# Patient Record
(demographics unavailable — no encounter records)

---

## 2024-10-30 NOTE — DISCUSSION/SUMMARY
[FreeTextEntry1] : Patient's therapist Jessica Tam's call returned today. ([phone]) LVM regarding purpose of the call and to call back office between 4:30 to 5 PM today or call tomorrow and leave message with therapists' availability to coordinate a call for case collaboration.

## 2024-12-04 NOTE — DISCUSSION/SUMMARY
[FreeTextEntry1] : Patient is a 35-year-old woman with first episode of major depression, mild in her early 20s that resolved with therapy, was stable without any symptoms of depression until 2017 about a year after her first childbirth, was seen for an evaluation on November 6, 2017, prescribed fluoxetine for treatment of depression but she did not come back for follow-up and did not start taking the medication, became pregnant had second child followed by postpartum depression after second childbirth reports the symptoms were significant, affecting her functioning and lasted up to 8 months and then she had a slight improvement of symptoms for about 3 months before the pandemic and the stress related to that and again becoming pregnant triggered a relapse of symptoms of depression and anxiety, gave birth to her youngest daughter 6 months ago, now presents with persisting, worsening symptoms consistent with dx of major depressive disorder and anxiety disorder.  Patient states she is not able to function and is struggling to maintain daily functioning and take care of her 3 children and feels that she would need help to treat her symptoms in addition to individual psychotherapy.  3/7/2022: Patient reports some improvement of anxiety sx, though reports sx of depression continue and she is tolerating Zoloft, will increase the dose to maximize therapeutic benefit.   4/5/2022:Patient reports some improvement of symptoms depression since 50% of improvement, she's tolerating the medication without side effects and would benefit by increasing the dose to increase the therapeutic benefit.  5/10/2022: Patient reports she noticed further improvement of symptoms of anxiety and depression and feels that there is about 85% improvement of symptoms since she started taking sertraline, reports no side effects and would benefit by increasing the dose for maximizing the therapeutic benefit.  Also discussed with the patient to work with her therapist to learn coping skills and cognitive strategies to reframe her negative thought and cope with stress.  6/28/2022: Patient reports further improvement of symptoms of depression after last increase in Zoloft dose and feels that she is about 90 to 95% closer to her baseline without any symptoms of depression, anxiety symptoms are in good control.  8/24/2022: Patient reports sustained improvement of symptoms of depression and anxiety since last visit and feels that she closer to her baseline.  1/13/2023: Patient reported that symptoms of depression and that he is slowly coming back in the past couple of months, likely triggered by recent psychosocial stressors.  Informed patient that trouble focusing and not completing tasks could be a symptom of depression and anxiety, and once these symptoms have resolved, we can revisit evaluation for dx of ADHD.    2/27/2023: Patient reported no significant change in symptoms since our last visit and also reported no side effects with the addition of bupropion and concerned about weight gain from sertraline.  Considering patient is tolerating bupropion will increase the dose for increased therapeutic benefit while monitoring for adverse effects, education about the side effects to monitor provided to the patient today.  4/17/2023: Patient reported improvement of depression and anxiety and with much reduction of this negative self-talk since increasing the bupropion to 300 mg daily and reported no side effects with the current medication regimen.  09/26/2023: The patient reported sustained improvement of symptoms of depression and anxiety on current medication regimen however she is concerned of the 40 pound weight gain she experienced after starting sertraline and wants to come off of sertraline.  Considering risk versus benefits of coming off of an SSRI and only being maintained on bupropion pose an increased risk of relapse of anxiety and depression symptoms, and advise patient switch sertraline to fluoxetine for better side effect profile with weight gain adverse effect  11/1/2023: Patient was stable on sertraline and bupropion combination but that she was concerned about the weight gain since starting sertraline and wanted to come off the medication.  Since last visit she is off of sertraline and on fluoxetine 10 mg daily for past 2 weeks and reporting a reemergence of symptoms of depression in the past week.  Patient would benefit from increasing the fluoxetine dose for increased therapeutic benefit  12/13/2023: The patient reports improving symptoms with reduced negative self-talk, rates depression at 3/10, 10 being worse. tolerating current dose of Prozac with mild adverse effect of fatigue, will continue same dose for sustained improvement of symptoms.   1/17/2024: Patient reported sustained improvement of symptoms of depression, anxiety also better controlled, recent stressor being her son diagnosed with ADHD and her having to navigate through the school system and understand her sons needs and making decisions in his best interest.  4/10/2024: The patient reported depression symptoms are improving, still continues to feel anxious and stress related parenting child with ADHD and finding she too likely has issues with attention since childhood and may have worsened in adulthood.   6/18/2024: The patient states depression symptoms remain in good control, and she is feeling more anxious, overwhelmed and with excessive worrying and remained at Prozac 40 mg/day as she did not have Rx for 10 mg, will increase dose and assess anxiety symptom improvement.    7/24/2024: The patient still reports feeling anxious and though not having pervasive sad mood, PHQ scores are in moderate range, with mild anhedonia, and KAT scores also in moderate range.  She has GREGORIO, not using CPAP. She has reported trouble focusing, likely from childhood, given she continues to have moderate levels of anxiety and depression symptoms and untreated GREGORIO, could also be contributing factors for her poor attention.  09/25/2024: Patient reports her mood and anxiety symptoms have improved and she is coping with the life stressors and a new routine for kids and she also reported no adverse effects from the medications, is interested in following up with cognitive coaching first before considering meds

## 2024-12-04 NOTE — PLAN
[No] : No [Medication education provided] : Medication education provided. [Rationale for medication choices, possible risks/precautions, benefits, alternative treatment choices, and consequences of non-treatment discussed] : Rationale for medication choices, possible risks/precautions, benefits, alternative treatment choices, and consequences of non-treatment discussed with patient/family/caregiver  [FreeTextEntry5] : Psychoeducation and supportive therapy provided and discussed rationale for recommended med changes Behavior activation Medication:  Continue Prozac 60 mg/day.  Continue bupropion  mg/day.  Advise patient consider treatment of GREGORIO Referred patient for cognitive coaching which she prefers and would benefit from even if meds needed to be considered later.  Educated patient of importance of remaining abstinent from drugs and alcohol.  Emergency procedures were discussed: pt. educated to call 911 or go to nearest ER for worsening of symptoms/suicidal/homicidal ideation.  Continue individual psychotherapy to learn coping skills (outside)  RTC in 2.5 to 3 months or earlier as needed.  Patient given opportunity to ask questions and their questions were answered and they expressed understanding and agreement with above plan.

## 2024-12-04 NOTE — REASON FOR VISIT
[Patient preference] : as per patient preference [Telehealth (audio & video) - Individual/Group] : This visit was provided via telehealth using real-time 2-way audio visual technology. [Other Location: e.g. Home (Enter Location, City,State)___] : The provider was located at [unfilled]. [Home] : The patient, [unfilled], was located at home, [unfilled], at the time of the visit. [Verbal consent obtained from patient/other participant(s)] : Verbal consent for telehealth/telephonic services obtained from patient/other participant(s) [Patient] : Patient [FreeTextEntry1] : depression and anxiety

## 2024-12-04 NOTE — HISTORY OF PRESENT ILLNESS
[FreeTextEntry1] : Medication changes made on last visit: Increase fluoxetine dose to 60 mg daily. Patient report Patient reported "my mood is okay".  Patient states meds are helping.  Patient states that that she is figuring out new routine andCoping with other stressors such as  being diagnosed with Lyme's disease and daughter requiring some stitches for an injury etc.  Patient reported her mood continues to remain stable and she denied pervasive sad mood, anhedonia and denied feeling hopeless or helpless and having passive or active suicidal ideation.  Patient reported also anxiety symptoms continue to remain stable.  Patient states that that she has not yet got a call from the [facility] and she also did not follow through.  She wants help with the executive functioning organization and prioritizing and managing her daily activities and improving her productivity.  Patient will be getting annual physical exam soon and was advised to also get an EKG for baseline if that she still was having trouble focusing maybe we can consider meds to address her symptoms after cognitive coaching. No ETOH, cannabis and or illicit drug use reported. Menstrual periods: regular even if it is not every 28 to 30 days Reports adherence to med regimen

## 2024-12-05 NOTE — HISTORY OF PRESENT ILLNESS
[de-identified] : 39 yo F with obesity, anxiety, HTN, pre-DM, GREGORIO presents for annual.  Pt has been on metformin for almost 1 year for pre-diabetes/weight loss. Started developing nausea on it. Did not lose much weight. Pt currently also doing weight watchers for past year. Pt on wellbutrin and prozac for anxiety. Pt was switched to prozac from sertraline in case it was relating to weight gain. Pt exercising regularly-- peloton, pilates and walks 10k steps daily. She lost 5 lbs since last year. She is also part of [facility] program at [facility] for more than 6 months as well.  anxiety/depression-- follows with psych. Doing ok but does get overwhelmed at times. Doing ok on prozac and wellbutrin for now.

## 2024-12-05 NOTE — HEALTH RISK ASSESSMENT
[No] : In the past 12 months have you used drugs other than those required for medical reasons? No [No falls in past year] : Patient reported no falls in the past year [Never] : Never [Patient reported PAP Smear was normal] : Patient reported PAP Smear was normal [NO] : No [Fully functional (bathing, dressing, toileting, transferring, walking, feeding)] : Fully functional (bathing, dressing, toileting, transferring, walking, feeding) [Fully functional (using the telephone, shopping, preparing meals, housekeeping, doing laundry, using] : Fully functional and needs no help or supervision to perform IADLs (using the telephone, shopping, preparing meals, housekeeping, doing laundry, using transportation, managing medications and managing finances) [de-identified] : walking, pilates, [place] [PapSmearDate] : 2021

## 2025-02-09 NOTE — PLAN
[No] : No [Medication education provided] : Medication education provided. [Rationale for medication choices, possible risks/precautions, benefits, alternative treatment choices, and consequences of non-treatment discussed] : Rationale for medication choices, possible risks/precautions, benefits, alternative treatment choices, and consequences of non-treatment discussed with patient/family/caregiver  [FreeTextEntry5] : Psychoeducation provided: Discussed the importance of continuing to work with her therapist on stress management and coping strategies. Supportive therapy: Provided supportive listening and encouragement for patient's efforts in managing her mental health and seeking additional support (e.g., dietician). Behavior activation: Encouraged patient to continue working on initiating new activities, such as cognitive coaching. Medication:  Continue Prozac 60 mg/day.  Continue bupropion  mg/day.  Advise patient consider treatment of GREGORIO Referred patient for cognitive coaching which she prefers and would benefit from even if meds needed to be considered later.  Educated patient of importance of remaining abstinent from drugs and alcohol.  Emergency procedures were discussed: pt. educated to call 911 or go to nearest ER for worsening of symptoms/suicidal/homicidal ideation.  Continue individual psychotherapy to learn coping skills (outside)  Recommended cognitive therapy/cognitive coaching. RTC in 3 months or earlier as needed.  Patient given opportunity to ask questions and their questions were answered and they expressed understanding and agreement with above plan.

## 2025-02-09 NOTE — DISCUSSION/SUMMARY
[FreeTextEntry1] : Patient is a 35-year-old woman with first episode of major depression, mild in her early 20s that resolved with therapy, was stable without any symptoms of depression until 2017 about a year after her first childbirth, was seen for an evaluation on November 6, 2017, prescribed fluoxetine for treatment of depression but she did not come back for follow-up and did not start taking the medication, became pregnant had second child followed by postpartum depression after second childbirth reports the symptoms were significant, affecting her functioning and lasted up to 8 months and then she had a slight improvement of symptoms for about 3 months before the pandemic and the stress related to that and again becoming pregnant triggered a relapse of symptoms of depression and anxiety, gave birth to her youngest daughter 6 months ago, now presents with persisting, worsening symptoms consistent with dx of major depressive disorder and anxiety disorder.  Patient states she is not able to function and is struggling to maintain daily functioning and take care of her 3 children and feels that she would need help to treat her symptoms in addition to individual psychotherapy.  3/7/2022: Patient reports some improvement of anxiety sx, though reports sx of depression continue and she is tolerating Zoloft, will increase the dose to maximize therapeutic benefit.   4/5/2022:Patient reports some improvement of symptoms depression since 50% of improvement, she's tolerating the medication without side effects and would benefit by increasing the dose to increase the therapeutic benefit.  5/10/2022: Patient reports she noticed further improvement of symptoms of anxiety and depression and feels that there is about 85% improvement of symptoms since she started taking sertraline, reports no side effects and would benefit by increasing the dose for maximizing the therapeutic benefit.  Also discussed with the patient to work with her therapist to learn coping skills and cognitive strategies to reframe her negative thought and cope with stress.  6/28/2022: Patient reports further improvement of symptoms of depression after last increase in Zoloft dose and feels that she is about 90 to 95% closer to her baseline without any symptoms of depression, anxiety symptoms are in good control.  8/24/2022: Patient reports sustained improvement of symptoms of depression and anxiety since last visit and feels that she closer to her baseline.  1/13/2023: Patient reported that symptoms of depression and that he is slowly coming back in the past couple of months, likely triggered by recent psychosocial stressors.  Informed patient that trouble focusing and not completing tasks could be a symptom of depression and anxiety, and once these symptoms have resolved, we can revisit evaluation for dx of ADHD.    2/27/2023: Patient reported no significant change in symptoms since our last visit and also reported no side effects with the addition of bupropion and concerned about weight gain from sertraline.  Considering patient is tolerating bupropion will increase the dose for increased therapeutic benefit while monitoring for adverse effects, education about the side effects to monitor provided to the patient today.  4/17/2023: Patient reported improvement of depression and anxiety and with much reduction of this negative self-talk since increasing the bupropion to 300 mg daily and reported no side effects with the current medication regimen.  09/26/2023: The patient reported sustained improvement of symptoms of depression and anxiety on current medication regimen however she is concerned of the 40 pound weight gain she experienced after starting sertraline and wants to come off of sertraline.  Considering risk versus benefits of coming off of an SSRI and only being maintained on bupropion pose an increased risk of relapse of anxiety and depression symptoms, and advise patient switch sertraline to fluoxetine for better side effect profile with weight gain adverse effect  11/1/2023: Patient was stable on sertraline and bupropion combination but that she was concerned about the weight gain since starting sertraline and wanted to come off the medication.  Since last visit she is off of sertraline and on fluoxetine 10 mg daily for past 2 weeks and reporting a reemergence of symptoms of depression in the past week.  Patient would benefit from increasing the fluoxetine dose for increased therapeutic benefit  12/13/2023: The patient reports improving symptoms with reduced negative self-talk, rates depression at 3/10, 10 being worse. tolerating current dose of Prozac with mild adverse effect of fatigue, will continue same dose for sustained improvement of symptoms.   1/17/2024: Patient reported sustained improvement of symptoms of depression, anxiety also better controlled, recent stressor being her son diagnosed with ADHD and her having to navigate through the school system and understand her sons needs and making decisions in his best interest.  4/10/2024: The patient reported depression symptoms are improving, still continues to feel anxious and stress related parenting child with ADHD and finding she too likely has issues with attention since childhood and may have worsened in adulthood.   6/18/2024: The patient states depression symptoms remain in good control, and she is feeling more anxious, overwhelmed and with excessive worrying and remained at Prozac 40 mg/day as she did not have Rx for 10 mg, will increase dose and assess anxiety symptom improvement.    7/24/2024: The patient still reports feeling anxious and though not having pervasive sad mood, PHQ scores are in moderate range, with mild anhedonia, and KAT scores also in moderate range.  She has GREGORIO, not using CPAP. She has reported trouble focusing, likely from childhood, given she continues to have moderate levels of anxiety and depression symptoms and untreated GREGORIO, could also be contributing factors for her poor attention.  09/25/2024: Patient reports her mood and anxiety symptoms have improved and she is coping with the life stressors and a new routine for kids and she also reported no adverse effects from the medications, is interested in following up with cognitive coaching first before considering meds  12/4/2024: The patient is currently struggling with feelings of overwhelm, increased reactivity and is finding difficulty in managing daily life responsibilities. There was an emphasis on her issues with current medication, Prozac, regarding its impact on her reactions and concern about weight gain which she experienced on Zoloft.  2/5/2025: The patient reports stable mood and is working with her therapist to manage stress and develop coping strategies. She has decided to maintain her current medication regimen. Sleep and appetite remain areas of focus. Patient denies suicidal ideation but reports occasional negative self-talk.

## 2025-02-09 NOTE — HISTORY OF PRESENT ILLNESS
[FreeTextEntry1] : Medication changes made on last visit: NonePatient reports having a good holiday season, managing to complete tasks ahead of time. She has decided to maintain current medication regimen and continue with Prozac and Wellbutrin and is working with therapist to manage stress and organization. Mood appears to be generally stable. Patient reports occasional negative self-talk when overwhelmed but denies persistent hopelessness. Patient reports difficulty initiating new activities, including connecting with cognitive coaching as previously discussed, possible anxiety or avoidance behaviors. Sleep is reported as 'fine' but described as a 'work in progress', getting on a regular sleep routine.  Appetite is described as a 'work in progress'. Patient reports eating after children go to sleep, particularly when tired or overwhelmed. Psychotic Symptoms: No psychotic symptoms reported or observed. Suicidal/Homicidal/Violent thoughts/intent/plan: Patient denies passive or active suicidal ideation. Reports occasional negative self-talk but no persistent hopelessness or suicidal thoughts. Side effects from medications: No specific side effects mentioned. Adherence to medication: Patient appears to be adherent to current medication regimen. Substance use history: Patient reports occasional alcohol use, such as having a drink at dinner the previous night. No concerns about excessive use expressed. No other substance use reported.  Medical update: Patient has discontinued Metformin approximately 1-2 months ago. She is attempting to get approval for Wegovy for weight management. Patient has scheduled a meeting with a dietician. Menstrual periods: Periods remain irregular, but patient reports having menstruation most months.  Psychosocial history: Patient continues to manage stress related to caring for children. She is working with a therapist to develop coping strategies.

## 2025-03-11 NOTE — HISTORY OF PRESENT ILLNESS
[FreeTextEntry1] : f/u [de-identified] : 39 yo F with HTN, anxiety, morbid obesity, GREGORIO presents for f/u.  Pt finally got approved for wegovy, on 3rd injection of 0.25mg. Feels great-- tolerating med, feels less hungry. Also eating more protein and vegetables, less carbs. Having less cravings. Continues to exercise. Overall, feels less anxious, sleep has also improved.

## 2025-03-11 NOTE — HISTORY OF PRESENT ILLNESS
[FreeTextEntry1] : f/u [de-identified] : 39 yo F with HTN, anxiety, morbid obesity, GREGORIO presents for f/u.  Pt finally got approved for wegovy, on 3rd injection of 0.25mg. Feels great-- tolerating med, feels less hungry. Also eating more protein and vegetables, less carbs. Having less cravings. Continues to exercise. Overall, feels less anxious, sleep has also improved.

## 2025-04-17 NOTE — HISTORY OF PRESENT ILLNESS
[Home] : at home, [unfilled] , at the time of the visit. [Telehealth (audio & video)] : This visit was provided via telehealth using real-time 2-way audio visual technology. [Verbal consent obtained from patient] : the patient, [unfilled] [FreeTextEntry1] : weight f/u [de-identified] : 39 yo F with morbid obesity presents for weight f/u. Pt on Wegovy 0.5mg for past month. Has lost 10 lbs. Some nausea in beginning but subsided. Continues to exercise, high protein, low carb diet.

## 2025-04-30 NOTE — HISTORY OF PRESENT ILLNESS
[FreeTextEntry1] : Patient states that she started Wegovy approximately two months ago. Patient has lost 20 pounds through proper eating and walking. Patient's son is now receiving services at school and is thriving. Patient's daughter is being evaluated for issues at school. Youngest daughter is in speech therapy twice a week for clarity of speech. Patient reports feeling 'pretty good' with improvement in mood. Less overwhelmed and negative self-talk noted. Patient denied feeling anxious or overwhelmed since last visit. Patient is using an Oura ring to track sleep patterns. The device suggests optimal sleep time between 9 and 10 p.m., which the patient is adhering to. Patient reports improved sleep habits due to this insight and structure. No specific changes in appetite mentioned, but patient reports eating properly as part of weight loss efforts. Patient reports main symptom from Wegovy is exhaustion and feeling tired. Patient reports walking regularly and exercising 'here and there', with plans to be more consistent. No psychotic symptoms reported or observed. Patient denies any suicidal thoughts. Side effects from medications: None from fluoxetine and bupropion Adherence to medication: Good Reported adherence to med regimen Denied excess ETOH use/abuse and or any other illicit drug use including cannabis.  Medical update: Patient has started Wegovy for weight management. Patient has lost 20 pounds since starting Wegovy and making lifestyle changes. Menstrual periods: regular Psychosocial history: Patient reports juggling multiple responsibilities related to children's needs.

## 2025-04-30 NOTE — DISCUSSION/SUMMARY
[FreeTextEntry1] : Patient is a 35-year-old woman with first episode of major depression, mild in her early 20s that resolved with therapy, was stable without any symptoms of depression until 2017 about a year after her first childbirth, was seen for an evaluation on November 6, 2017, prescribed fluoxetine for treatment of depression but she did not come back for follow-up and did not start taking the medication, became pregnant had second child followed by postpartum depression after second childbirth reports the symptoms were significant, affecting her functioning and lasted up to 8 months and then she had a slight improvement of symptoms for about 3 months before the pandemic and the stress related to that and again becoming pregnant triggered a relapse of symptoms of depression and anxiety, gave birth to her youngest daughter 6 months ago, now presents with persisting, worsening symptoms consistent with dx of major depressive disorder and anxiety disorder.  Patient states she is not able to function and is struggling to maintain daily functioning and take care of her 3 children and feels that she would need help to treat her symptoms in addition to individual psychotherapy.  3/7/2022: Patient reports some improvement of anxiety sx, though reports sx of depression continue and she is tolerating Zoloft, will increase the dose to maximize therapeutic benefit.   4/5/2022:Patient reports some improvement of symptoms depression since 50% of improvement, she's tolerating the medication without side effects and would benefit by increasing the dose to increase the therapeutic benefit.  5/10/2022: Patient reports she noticed further improvement of symptoms of anxiety and depression and feels that there is about 85% improvement of symptoms since she started taking sertraline, reports no side effects and would benefit by increasing the dose for maximizing the therapeutic benefit.  Also discussed with the patient to work with her therapist to learn coping skills and cognitive strategies to reframe her negative thought and cope with stress.  6/28/2022: Patient reports further improvement of symptoms of depression after last increase in Zoloft dose and feels that she is about 90 to 95% closer to her baseline without any symptoms of depression, anxiety symptoms are in good control.  8/24/2022: Patient reports sustained improvement of symptoms of depression and anxiety since last visit and feels that she closer to her baseline.  1/13/2023: Patient reported that symptoms of depression and that he is slowly coming back in the past couple of months, likely triggered by recent psychosocial stressors.  Informed patient that trouble focusing and not completing tasks could be a symptom of depression and anxiety, and once these symptoms have resolved, we can revisit evaluation for dx of ADHD.    2/27/2023: Patient reported no significant change in symptoms since our last visit and also reported no side effects with the addition of bupropion and concerned about weight gain from sertraline.  Considering patient is tolerating bupropion will increase the dose for increased therapeutic benefit while monitoring for adverse effects, education about the side effects to monitor provided to the patient today.  4/17/2023: Patient reported improvement of depression and anxiety and with much reduction of this negative self-talk since increasing the bupropion to 300 mg daily and reported no side effects with the current medication regimen.  09/26/2023: The patient reported sustained improvement of symptoms of depression and anxiety on current medication regimen however she is concerned of the 40 pound weight gain she experienced after starting sertraline and wants to come off of sertraline.  Considering risk versus benefits of coming off of an SSRI and only being maintained on bupropion pose an increased risk of relapse of anxiety and depression symptoms, and advise patient switch sertraline to fluoxetine for better side effect profile with weight gain adverse effect  11/1/2023: Patient was stable on sertraline and bupropion combination but that she was concerned about the weight gain since starting sertraline and wanted to come off the medication.  Since last visit she is off of sertraline and on fluoxetine 10 mg daily for past 2 weeks and reporting a reemergence of symptoms of depression in the past week.  Patient would benefit from increasing the fluoxetine dose for increased therapeutic benefit  12/13/2023: The patient reports improving symptoms with reduced negative self-talk, rates depression at 3/10, 10 being worse. tolerating current dose of Prozac with mild adverse effect of fatigue, will continue same dose for sustained improvement of symptoms.   1/17/2024: Patient reported sustained improvement of symptoms of depression, anxiety also better controlled, recent stressor being her son diagnosed with ADHD and her having to navigate through the school system and understand her sons needs and making decisions in his best interest.  4/10/2024: The patient reported depression symptoms are improving, still continues to feel anxious and stress related parenting child with ADHD and finding she too likely has issues with attention since childhood and may have worsened in adulthood.   6/18/2024: The patient states depression symptoms remain in good control, and she is feeling more anxious, overwhelmed and with excessive worrying and remained at Prozac 40 mg/day as she did not have Rx for 10 mg, will increase dose and assess anxiety symptom improvement.    7/24/2024: The patient still reports feeling anxious and though not having pervasive sad mood, PHQ scores are in moderate range, with mild anhedonia, and KAT scores also in moderate range.  She has GREGORIO, not using CPAP. She has reported trouble focusing, likely from childhood, given she continues to have moderate levels of anxiety and depression symptoms and untreated GREGORIO, could also be contributing factors for her poor attention.  09/25/2024: Patient reports her mood and anxiety symptoms have improved and she is coping with the life stressors and a new routine for kids and she also reported no adverse effects from the medications, is interested in following up with cognitive coaching first before considering meds  12/4/2024: The patient is currently struggling with feelings of overwhelm, increased reactivity and is finding difficulty in managing daily life responsibilities. There was an emphasis on her issues with current medication, Prozac, regarding its impact on her reactions and concern about weight gain which she experienced on Zoloft.  2/5/2025: The patient reports stable mood and is working with her therapist to manage stress and develop coping strategies. She has decided to maintain her current medication regimen. Sleep and appetite remain areas of focus. Patient denies suicidal ideation but reports occasional negative self-talk.  4/30/2025: Patient reports overall improvement in mood and weight management since last visit and feels that adding Wegovy for weight management and losing weight is also helping her mood. Sleep has improved with the use of an Oura ring for tracking and guidance. Family stressors are present but being actively managed with appropriate interventions for children.

## 2025-04-30 NOTE — PLAN
[No] : No [Medication education provided] : Medication education provided. [Rationale for medication choices, possible risks/precautions, benefits, alternative treatment choices, and consequences of non-treatment discussed] : Rationale for medication choices, possible risks/precautions, benefits, alternative treatment choices, and consequences of non-treatment discussed with patient/family/caregiver  [FreeTextEntry5] : Psychoeducation provided: Discussed the importance of continuing to work with her therapist on stress management and coping strategies. Supportive therapy: Provided supportive listening and encouragement for patient's efforts in managing her mental health and childcare needs Behavior activation: Encouraged patient to continue with walking and to increase consistency with exercise routine. Sleep hygiene education: Reinforced the benefits of maintaining consistent sleep schedule as suggested by the Oura ring data. Lifestyle changes reviewed: Acknowledged patient's efforts in proper eating and increased physical activity. Medication:  Continue Prozac 60 mg/day.  Continue bupropion  mg/day.  Patient prefers to defer cognitive coaching at this time and wants to work with her therapist  Educated patient of importance of remaining abstinent from drugs and alcohol.  Emergency procedures were discussed: pt. educated to call 911 or go to nearest ER for worsening of symptoms/suicidal/homicidal ideation.  RTC in 3 months or earlier as needed.  Patient given opportunity to ask questions and their questions were answered and they expressed understanding and agreement with above plan.

## 2025-07-01 NOTE — HISTORY OF PRESENT ILLNESS
[Home] : at home, [unfilled] , at the time of the visit. [Medical Office: ([facility])___] : at the medical office located in  [Telehealth (audio & video)] : This visit was provided via telehealth using real-time 2-way audio visual technology. [Verbal consent obtained from patient] : the patient, [unfilled] [FreeTextEntry1] : fu weight loss [de-identified] : 40 yo F obesity, GREGORIO, HTN, anxiety presents for weight loss f/u.   Pt on wegovy 1mg. So far has lost 20 lbs since starting in Feb. She continues to walk for exercise and strength train. She has started line dancing classes. Diet has been good-- mostly home cooked meals, small portions. Tolerating med well, no side effects. Starting to feel weight not coming off as well. Otherwise, feeling great, able to wear jeans again, less tired.

## 2025-07-30 NOTE — DISCUSSION/SUMMARY
[FreeTextEntry1] : Patient is a 35-year-old woman with first episode of major depression, mild in her early 20s that resolved with therapy, was stable without any symptoms of depression until 2017 about a year after her first childbirth, was seen for an evaluation on November 6, 2017, prescribed fluoxetine for treatment of depression but she did not come back for follow-up and did not start taking the medication, became pregnant had second child followed by postpartum depression after second childbirth reports the symptoms were significant, affecting her functioning and lasted up to 8 months and then she had a slight improvement of symptoms for about 3 months before the pandemic and the stress related to that and again becoming pregnant triggered a relapse of symptoms of depression and anxiety, gave birth to her youngest daughter 6 months ago, now presents with persisting, worsening symptoms consistent with dx of major depressive disorder and anxiety disorder.  Patient states she is not able to function and is struggling to maintain daily functioning and take care of her 3 children and feels that she would need help to treat her symptoms in addition to individual psychotherapy.  3/7/2022: Patient reports some improvement of anxiety sx, though reports sx of depression continue and she is tolerating Zoloft, will increase the dose to maximize therapeutic benefit.   4/5/2022:Patient reports some improvement of symptoms depression since 50% of improvement, she's tolerating the medication without side effects and would benefit by increasing the dose to increase the therapeutic benefit.  5/10/2022: Patient reports she noticed further improvement of symptoms of anxiety and depression and feels that there is about 85% improvement of symptoms since she started taking sertraline, reports no side effects and would benefit by increasing the dose for maximizing the therapeutic benefit.  Also discussed with the patient to work with her therapist to learn coping skills and cognitive strategies to reframe her negative thought and cope with stress.  6/28/2022: Patient reports further improvement of symptoms of depression after last increase in Zoloft dose and feels that she is about 90 to 95% closer to her baseline without any symptoms of depression, anxiety symptoms are in good control.  8/24/2022: Patient reports sustained improvement of symptoms of depression and anxiety since last visit and feels that she closer to her baseline.  1/13/2023: Patient reported that symptoms of depression and that he is slowly coming back in the past couple of months, likely triggered by recent psychosocial stressors.  Informed patient that trouble focusing and not completing tasks could be a symptom of depression and anxiety, and once these symptoms have resolved, we can revisit evaluation for dx of ADHD.    2/27/2023: Patient reported no significant change in symptoms since our last visit and also reported no side effects with the addition of bupropion and concerned about weight gain from sertraline.  Considering patient is tolerating bupropion will increase the dose for increased therapeutic benefit while monitoring for adverse effects, education about the side effects to monitor provided to the patient today.  4/17/2023: Patient reported improvement of depression and anxiety and with much reduction of this negative self-talk since increasing the bupropion to 300 mg daily and reported no side effects with the current medication regimen.  09/26/2023: The patient reported sustained improvement of symptoms of depression and anxiety on current medication regimen however she is concerned of the 40 pound weight gain she experienced after starting sertraline and wants to come off of sertraline.  Considering risk versus benefits of coming off of an SSRI and only being maintained on bupropion pose an increased risk of relapse of anxiety and depression symptoms, and advise patient switch sertraline to fluoxetine for better side effect profile with weight gain adverse effect  11/1/2023: Patient was stable on sertraline and bupropion combination but that she was concerned about the weight gain since starting sertraline and wanted to come off the medication.  Since last visit she is off of sertraline and on fluoxetine 10 mg daily for past 2 weeks and reporting a reemergence of symptoms of depression in the past week.  Patient would benefit from increasing the fluoxetine dose for increased therapeutic benefit  12/13/2023: The patient reports improving symptoms with reduced negative self-talk, rates depression at 3/10, 10 being worse. tolerating current dose of Prozac with mild adverse effect of fatigue, will continue same dose for sustained improvement of symptoms.   1/17/2024: Patient reported sustained improvement of symptoms of depression, anxiety also better controlled, recent stressor being her son diagnosed with ADHD and her having to navigate through the school system and understand her sons needs and making decisions in his best interest.  4/10/2024: The patient reported depression symptoms are improving, still continues to feel anxious and stress related parenting child with ADHD and finding she too likely has issues with attention since childhood and may have worsened in adulthood.   6/18/2024: The patient states depression symptoms remain in good control, and she is feeling more anxious, overwhelmed and with excessive worrying and remained at Prozac 40 mg/day as she did not have Rx for 10 mg, will increase dose and assess anxiety symptom improvement.    7/24/2024: The patient still reports feeling anxious and though not having pervasive sad mood, PHQ scores are in moderate range, with mild anhedonia, and KAT scores also in moderate range.  She has GREGORIO, not using CPAP. She has reported trouble focusing, likely from childhood, given she continues to have moderate levels of anxiety and depression symptoms and untreated GREGORIO, could also be contributing factors for her poor attention.  09/25/2024: Patient reports her mood and anxiety symptoms have improved and she is coping with the life stressors and a new routine for kids and she also reported no adverse effects from the medications, is interested in following up with cognitive coaching first before considering meds  12/4/2024: The patient is currently struggling with feelings of overwhelm, increased reactivity and is finding difficulty in managing daily life responsibilities. There was an emphasis on her issues with current medication, Prozac, regarding its impact on her reactions and concern about weight gain which she experienced on Zoloft.  2/5/2025: The patient reports stable mood and is working with her therapist to manage stress and develop coping strategies. She has decided to maintain her current medication regimen. Sleep and appetite remain areas of focus. Patient denies suicidal ideation but reports occasional negative self-talk.  4/30/2025: Patient reports overall improvement in mood and weight management since last visit and feels that adding Wegovy for weight management and losing weight is also helping her mood. Sleep has improved with the use of an Oura ring for tracking and guidance. Family stressors are present but being actively managed with appropriate interventions for children. 7/30/3035: Patient appears to be in good spirits, in calm and positive state with minimal symptoms of depression or anxiety. She continues therapy, including newly initiated EMDR sessions, and adheres to her current medications effectively.

## 2025-07-30 NOTE — PLAN
[No] : No [Medication education provided] : Medication education provided. [Rationale for medication choices, possible risks/precautions, benefits, alternative treatment choices, and consequences of non-treatment discussed] : Rationale for medication choices, possible risks/precautions, benefits, alternative treatment choices, and consequences of non-treatment discussed with patient/family/caregiver  [FreeTextEntry5] : Psychoeducation provided: Discussed the importance of continuing to work with her therapist on stress management and coping strategies. Supportive therapy: Provided supportive listening and encouragement for patient's efforts in managing her mental health and childcare needs Behavior activation: Encouraged patient to continue with walking and to increase consistency with exercise routine. Medication:  Continue Prozac 60 mg/day.  Continue bupropion  mg/day.  Educated patient of importance of remaining abstinent from drugs and alcohol.  Emergency procedures were discussed: pt. educated to call 911 or go to nearest ER for worsening of symptoms/suicidal/homicidal ideation.  Continue therapy- outside.  RTC in 3 months or earlier as needed.  Patient given opportunity to ask questions and their questions were answered and they expressed understanding and agreement with above plan.

## 2025-07-30 NOTE — HISTORY OF PRESENT ILLNESS
[FreeTextEntry1] : Patient states that she started Wegovy approximately two months ago. Patient has lost 20 pounds through proper eating and walking. Patient's son is now receiving services at school and is thriving. Patient's daughter is being evaluated for issues at school. Youngest daughter is in speech therapy twice a week for clarity of speech. Patient reports feeling 'pretty good' with improvement in mood. Less overwhelmed and negative self-talk noted. Patient reports, over the past three months, things have been going well, and she describes the summer as a positive, enjoyable time. She states she started EMDR therapy with a different therapist while continuing regular supportive therapy with her current therapist. No recent periods of depression or anxiety have occurred. Patient states her children are attending day camps, providing her with additional personal time, which she describes as rejuvenating. Patient reports her mood is positive and calm, with minimal symptoms. She states current anxiety and depression levels are very low and rates them at 2/10, 10 being worse. Patient continues to track her sleep using an Oura ring and reports getting sufficient hours of sleep. She states her sleep is restorative and her quality of sleep has been stable. Appetite: Patient reports no changes in appetite. Eating habits remain stable. No psychotic symptoms reported or observed. Patient denies any suicidal thoughts. Side effects from medications: None from fluoxetine and bupropion Adherence to medication: Good Reported adherence to med regimen Denied excess ETOH use/abuse and or any other illicit drug use including cannabis. Medical update: No changes in physical health or new medical issues. No new medications have been introduced. She is continuing to take Wegovy, good effect, and tolereability reported except ocacsional mild nause.  Menstrual cycle: no issues reported  Psychosocial history: Patient reports spending summer focusing on herself, while her children attend day camps. She mentions her son has an Individualized Education Plan (IEP) and performed well during the school year. She also plans to have her daughter evaluated due to academic struggles this past school year. Overall, home and family life remain stable.